# Patient Record
Sex: FEMALE | URBAN - METROPOLITAN AREA
[De-identification: names, ages, dates, MRNs, and addresses within clinical notes are randomized per-mention and may not be internally consistent; named-entity substitution may affect disease eponyms.]

---

## 2023-08-03 ENCOUNTER — TRANSCRIBE ORDERS (OUTPATIENT)
Facility: HOSPITAL | Age: 58
End: 2023-08-03

## 2023-08-03 DIAGNOSIS — R06.09 DOE (DYSPNEA ON EXERTION): Primary | ICD-10-CM

## 2023-08-23 ENCOUNTER — HOSPITAL ENCOUNTER (OUTPATIENT)
Facility: HOSPITAL | Age: 58
Discharge: HOME | End: 2023-08-25
Attending: INTERNAL MEDICINE
Payer: COMMERCIAL

## 2023-08-23 VITALS
HEIGHT: 64 IN | WEIGHT: 240 LBS | HEART RATE: 81 BPM | BODY MASS INDEX: 40.97 KG/M2 | SYSTOLIC BLOOD PRESSURE: 154 MMHG | DIASTOLIC BLOOD PRESSURE: 64 MMHG

## 2023-08-23 DIAGNOSIS — R06.09 DOE (DYSPNEA ON EXERTION): ICD-10-CM

## 2023-08-23 LAB
ECHO AO ROOT DIAM: 2.8 CM
ECHO AO ROOT INDEX: 1.33 CM/M2
ECHO AV AREA PEAK VELOCITY: 2.7 CM2
ECHO AV AREA VTI: 2.4 CM2
ECHO AV AREA/BSA PEAK VELOCITY: 1.3 CM2/M2
ECHO AV AREA/BSA VTI: 1.1 CM2/M2
ECHO AV MEAN GRADIENT: 4 MMHG
ECHO AV MEAN VELOCITY: 0.9 M/S
ECHO AV PEAK GRADIENT: 7 MMHG
ECHO AV PEAK VELOCITY: 1.3 M/S
ECHO AV VELOCITY RATIO: 0.92
ECHO AV VTI: 29.2 CM
ECHO BSA: 2.22 M2
ECHO LA DIAMETER INDEX: 1.47 CM/M2
ECHO LA DIAMETER: 3.1 CM
ECHO LA TO AORTIC ROOT RATIO: 1.11
ECHO LA VOL 2C: 21 ML (ref 22–52)
ECHO LA VOL 4C: 21 ML (ref 22–52)
ECHO LA VOL BP: 21 ML (ref 22–52)
ECHO LA VOL/BSA BIPLANE: 10 ML/M2 (ref 16–34)
ECHO LA VOLUME AREA LENGTH: 23 ML
ECHO LA VOLUME INDEX A2C: 10 ML/M2 (ref 16–34)
ECHO LA VOLUME INDEX A4C: 10 ML/M2 (ref 16–34)
ECHO LA VOLUME INDEX AREA LENGTH: 11 ML/M2 (ref 16–34)
ECHO LV E' LATERAL VELOCITY: 10 CM/S
ECHO LV E' SEPTAL VELOCITY: 8 CM/S
ECHO LV EDV A2C: 47 ML
ECHO LV EDV A4C: 58 ML
ECHO LV EDV BP: 52 ML (ref 56–104)
ECHO LV EDV INDEX A4C: 27 ML/M2
ECHO LV EDV INDEX BP: 25 ML/M2
ECHO LV EDV NDEX A2C: 22 ML/M2
ECHO LV EJECTION FRACTION A2C: 57 %
ECHO LV EJECTION FRACTION A4C: 55 %
ECHO LV EJECTION FRACTION BIPLANE: 55 % (ref 55–100)
ECHO LV ESV A2C: 20 ML
ECHO LV ESV A4C: 26 ML
ECHO LV ESV BP: 24 ML (ref 19–49)
ECHO LV ESV INDEX A2C: 9 ML/M2
ECHO LV ESV INDEX A4C: 12 ML/M2
ECHO LV ESV INDEX BP: 11 ML/M2
ECHO LV FRACTIONAL SHORTENING: 36 % (ref 28–44)
ECHO LV INTERNAL DIMENSION DIASTOLE INDEX: 2.13 CM/M2
ECHO LV INTERNAL DIMENSION DIASTOLIC: 4.5 CM (ref 3.9–5.3)
ECHO LV INTERNAL DIMENSION SYSTOLIC INDEX: 1.37 CM/M2
ECHO LV INTERNAL DIMENSION SYSTOLIC: 2.9 CM
ECHO LV IVSD: 0.7 CM (ref 0.6–0.9)
ECHO LV MASS 2D: 104.5 G (ref 67–162)
ECHO LV MASS INDEX 2D: 49.5 G/M2 (ref 43–95)
ECHO LV POSTERIOR WALL DIASTOLIC: 0.8 CM (ref 0.6–0.9)
ECHO LV RELATIVE WALL THICKNESS RATIO: 0.36
ECHO LVOT AREA: 3.1 CM2
ECHO LVOT AV VTI INDEX: 0.77
ECHO LVOT DIAM: 2 CM
ECHO LVOT MEAN GRADIENT: 2 MMHG
ECHO LVOT PEAK GRADIENT: 5 MMHG
ECHO LVOT PEAK VELOCITY: 1.2 M/S
ECHO LVOT STROKE VOLUME INDEX: 33.6 ML/M2
ECHO LVOT SV: 71 ML
ECHO LVOT VTI: 22.6 CM
ECHO MV A VELOCITY: 0.81 M/S
ECHO MV E DECELERATION TIME (DT): 272.1 MS
ECHO MV E VELOCITY: 0.62 M/S
ECHO MV E/A RATIO: 0.77
ECHO MV E/E' LATERAL: 6.2
ECHO MV E/E' RATIO (AVERAGED): 6.98
ECHO MV E/E' SEPTAL: 7.75
ECHO RA END SYSTOLIC VOLUME APICAL 4 CHAMBER INDEX BSA: 10 ML/M2
ECHO RA VOLUME: 21 ML
ECHO RV FREE WALL PEAK S': 12 CM/S
ECHO RV INTERNAL DIMENSION: 2.1 CM
ECHO RV TAPSE: 1.6 CM (ref 1.7–?)

## 2023-08-23 PROCEDURE — 93306 TTE W/DOPPLER COMPLETE: CPT

## 2023-09-08 RX ORDER — NEBIVOLOL 10 MG/1
10 TABLET ORAL DAILY
COMMUNITY

## 2023-09-08 RX ORDER — ESTRADIOL 0.05 MG/D
1 PATCH, EXTENDED RELEASE TRANSDERMAL
Status: ON HOLD | COMMUNITY
End: 2023-09-11 | Stop reason: HOSPADM

## 2023-09-08 RX ORDER — ONDANSETRON 4 MG/1
4 TABLET, ORALLY DISINTEGRATING ORAL EVERY 8 HOURS PRN
Status: ON HOLD | COMMUNITY
End: 2023-09-11 | Stop reason: HOSPADM

## 2023-09-08 RX ORDER — CHOLECALCIFEROL (VITAMIN D3) 1250 MCG
CAPSULE ORAL
COMMUNITY

## 2023-09-08 RX ORDER — ROSUVASTATIN CALCIUM 10 MG/1
10 TABLET, COATED ORAL
COMMUNITY

## 2023-09-08 RX ORDER — FESOTERODINE FUMARATE 8 MG/1
TABLET, EXTENDED RELEASE ORAL
Status: ON HOLD | COMMUNITY
End: 2023-09-11 | Stop reason: HOSPADM

## 2023-09-11 ENCOUNTER — HOSPITAL ENCOUNTER (OUTPATIENT)
Facility: HOSPITAL | Age: 58
Setting detail: OUTPATIENT SURGERY
Discharge: HOME | End: 2023-09-11
Attending: INTERNAL MEDICINE | Admitting: INTERNAL MEDICINE
Payer: COMMERCIAL

## 2023-09-11 VITALS
HEART RATE: 67 BPM | DIASTOLIC BLOOD PRESSURE: 70 MMHG | BODY MASS INDEX: 41.66 KG/M2 | HEIGHT: 64 IN | TEMPERATURE: 98.1 F | WEIGHT: 244 LBS | RESPIRATION RATE: 20 BRPM | OXYGEN SATURATION: 98 % | SYSTOLIC BLOOD PRESSURE: 118 MMHG

## 2023-09-11 DIAGNOSIS — R06.09 DOE (DYSPNEA ON EXERTION): ICD-10-CM

## 2023-09-11 DIAGNOSIS — R94.39 ABNORMAL STRESS TEST: ICD-10-CM

## 2023-09-11 DIAGNOSIS — R07.9 CHEST PAIN: ICD-10-CM

## 2023-09-11 LAB
ANION GAP BLD CALC-SCNC: ABNORMAL (ref 10–20)
ANION GAP BLD CALC-SCNC: ABNORMAL (ref 10–20)
ANION GAP SERPL CALC-SCNC: 2 MMOL/L (ref 3–18)
BASE DEFICIT BLD-SCNC: 1 MMOL/L
BASE EXCESS BLD CALC-SCNC: 0 MMOL/L
BASOPHILS # BLD: 0 K/UL (ref 0–0.1)
BASOPHILS NFR BLD: 0 % (ref 0–2)
BUN SERPL-MCNC: 23 MG/DL (ref 7–18)
BUN/CREAT SERPL: 33 (ref 12–20)
CA-I BLD-MCNC: 1.2 MMOL/L (ref 1.12–1.32)
CA-I BLD-MCNC: 1.23 MMOL/L (ref 1.12–1.32)
CALCIUM SERPL-MCNC: 8.6 MG/DL (ref 8.5–10.1)
CHLORIDE BLD-SCNC: 109 MMOL/L (ref 98–107)
CHLORIDE BLD-SCNC: 110 MMOL/L (ref 98–107)
CHLORIDE SERPL-SCNC: 114 MMOL/L (ref 100–111)
CHOLEST SERPL-MCNC: 139 MG/DL
CO2 BLD-SCNC: 23 MMOL/L (ref 19–24)
CO2 BLD-SCNC: 25 MMOL/L (ref 19–24)
CO2 SERPL-SCNC: 28 MMOL/L (ref 21–32)
CREAT BLD-MCNC: 0.32 MG/DL (ref 0.6–1.3)
CREAT BLD-MCNC: 0.36 MG/DL (ref 0.6–1.3)
CREAT SERPL-MCNC: 0.69 MG/DL (ref 0.6–1.3)
DIFFERENTIAL METHOD BLD: NORMAL
ECHO BSA: 2.24 M2
EOSINOPHIL # BLD: 0.1 K/UL (ref 0–0.4)
EOSINOPHIL NFR BLD: 2 % (ref 0–5)
ERYTHROCYTE [DISTWIDTH] IN BLOOD BY AUTOMATED COUNT: 14 % (ref 11.6–14.5)
FIO2 ON VENT: 21 %
FIO2 ON VENT: 21 %
GLUCOSE BLD-MCNC: 94 MG/DL (ref 65–100)
GLUCOSE BLD-MCNC: 96 MG/DL (ref 65–100)
GLUCOSE SERPL-MCNC: 94 MG/DL (ref 74–99)
HCO3 BLD-SCNC: 23.4 MMOL/L (ref 22–26)
HCO3 BLD-SCNC: 25.3 MMOL/L (ref 22–26)
HCT VFR BLD AUTO: 40.7 % (ref 35–45)
HDLC SERPL-MCNC: 65 MG/DL (ref 40–60)
HDLC SERPL: 2.1 (ref 0–5)
HGB BLD-MCNC: 12.9 G/DL (ref 12–16)
IMM GRANULOCYTES # BLD AUTO: 0 K/UL (ref 0–0.04)
IMM GRANULOCYTES NFR BLD AUTO: 0 % (ref 0–0.5)
INR PPP: 0.9 (ref 0.9–1.1)
LACTATE BLD-SCNC: <0.4 MMOL/L (ref 0.4–2)
LACTATE BLD-SCNC: <0.4 MMOL/L (ref 0.4–2)
LDLC SERPL CALC-MCNC: 60.4 MG/DL (ref 0–100)
LIPID PANEL: ABNORMAL
LYMPHOCYTES # BLD: 1.5 K/UL (ref 0.9–3.6)
LYMPHOCYTES NFR BLD: 21 % (ref 21–52)
MAGNESIUM SERPL-MCNC: 2.1 MG/DL (ref 1.6–2.6)
MCH RBC QN AUTO: 29 PG (ref 24–34)
MCHC RBC AUTO-ENTMCNC: 31.7 G/DL (ref 31–37)
MCV RBC AUTO: 91.5 FL (ref 78–100)
MONOCYTES # BLD: 0.6 K/UL (ref 0.05–1.2)
MONOCYTES NFR BLD: 8 % (ref 3–10)
NEUTS SEG # BLD: 4.9 K/UL (ref 1.8–8)
NEUTS SEG NFR BLD: 69 % (ref 40–73)
NRBC # BLD: 0 K/UL (ref 0–0.01)
NRBC BLD-RTO: 0 PER 100 WBC
PCO2 BLD: 37.6 MMHG (ref 35–45)
PCO2 BLDV: 42.5 MMHG (ref 41–51)
PH BLD: 7.4 (ref 7.35–7.45)
PH BLDV: 7.38 (ref 7.32–7.42)
PLATELET # BLD AUTO: 232 K/UL (ref 135–420)
PMV BLD AUTO: 9.2 FL (ref 9.2–11.8)
PO2 BLD: 80 MMHG (ref 80–100)
PO2 BLDV: 42 MMHG (ref 25–40)
POTASSIUM BLD-SCNC: 3.8 MMOL/L (ref 3.5–5.1)
POTASSIUM BLD-SCNC: 3.9 MMOL/L (ref 3.5–5.1)
POTASSIUM SERPL-SCNC: 4 MMOL/L (ref 3.5–5.5)
PROTHROMBIN TIME: 12.6 SEC (ref 11.9–14.7)
RBC # BLD AUTO: 4.45 M/UL (ref 4.2–5.3)
SAO2 % BLD: 76 %
SAO2 % BLD: 96 %
SERVICE CMNT-IMP: ABNORMAL
SERVICE CMNT-IMP: ABNORMAL
SODIUM BLD-SCNC: 145 MMOL/L (ref 136–145)
SODIUM BLD-SCNC: 145 MMOL/L (ref 136–145)
SODIUM SERPL-SCNC: 144 MMOL/L (ref 136–145)
SPECIMEN SITE: ABNORMAL
SPECIMEN SITE: ABNORMAL
TRIGL SERPL-MCNC: 68 MG/DL
VLDLC SERPL CALC-MCNC: 13.6 MG/DL
WBC # BLD AUTO: 7.1 K/UL (ref 4.6–13.2)

## 2023-09-11 PROCEDURE — 82947 ASSAY GLUCOSE BLOOD QUANT: CPT

## 2023-09-11 PROCEDURE — 80061 LIPID PANEL: CPT

## 2023-09-11 PROCEDURE — 82330 ASSAY OF CALCIUM: CPT

## 2023-09-11 PROCEDURE — 84295 ASSAY OF SERUM SODIUM: CPT

## 2023-09-11 PROCEDURE — 2500000003 HC RX 250 WO HCPCS: Performed by: INTERNAL MEDICINE

## 2023-09-11 PROCEDURE — 99153 MOD SED SAME PHYS/QHP EA: CPT | Performed by: INTERNAL MEDICINE

## 2023-09-11 PROCEDURE — 80048 BASIC METABOLIC PNL TOTAL CA: CPT

## 2023-09-11 PROCEDURE — C1769 GUIDE WIRE: HCPCS | Performed by: INTERNAL MEDICINE

## 2023-09-11 PROCEDURE — 6360000004 HC RX CONTRAST MEDICATION: Performed by: INTERNAL MEDICINE

## 2023-09-11 PROCEDURE — 85025 COMPLETE CBC W/AUTO DIFF WBC: CPT

## 2023-09-11 PROCEDURE — 6360000002 HC RX W HCPCS: Performed by: INTERNAL MEDICINE

## 2023-09-11 PROCEDURE — 83735 ASSAY OF MAGNESIUM: CPT

## 2023-09-11 PROCEDURE — 85610 PROTHROMBIN TIME: CPT

## 2023-09-11 PROCEDURE — C1894 INTRO/SHEATH, NON-LASER: HCPCS | Performed by: INTERNAL MEDICINE

## 2023-09-11 PROCEDURE — 83605 ASSAY OF LACTIC ACID: CPT

## 2023-09-11 PROCEDURE — 2580000003 HC RX 258: Performed by: INTERNAL MEDICINE

## 2023-09-11 PROCEDURE — 99152 MOD SED SAME PHYS/QHP 5/>YRS: CPT | Performed by: INTERNAL MEDICINE

## 2023-09-11 PROCEDURE — 93460 R&L HRT ART/VENTRICLE ANGIO: CPT | Performed by: INTERNAL MEDICINE

## 2023-09-11 PROCEDURE — 85014 HEMATOCRIT: CPT

## 2023-09-11 PROCEDURE — 84132 ASSAY OF SERUM POTASSIUM: CPT

## 2023-09-11 PROCEDURE — 6370000000 HC RX 637 (ALT 250 FOR IP): Performed by: INTERNAL MEDICINE

## 2023-09-11 PROCEDURE — 82803 BLOOD GASES ANY COMBINATION: CPT

## 2023-09-11 PROCEDURE — 2709999900 HC NON-CHARGEABLE SUPPLY: Performed by: INTERNAL MEDICINE

## 2023-09-11 PROCEDURE — 36415 COLL VENOUS BLD VENIPUNCTURE: CPT

## 2023-09-11 RX ORDER — SODIUM CHLORIDE 9 MG/ML
INJECTION, SOLUTION INTRAVENOUS PRN
Status: DISCONTINUED | OUTPATIENT
Start: 2023-09-11 | End: 2023-09-11 | Stop reason: HOSPADM

## 2023-09-11 RX ORDER — ROSUVASTATIN CALCIUM 10 MG/1
10 TABLET, COATED ORAL DAILY
Status: ON HOLD | COMMUNITY
End: 2023-09-11 | Stop reason: HOSPADM

## 2023-09-11 RX ORDER — HEPARIN SODIUM 1000 [USP'U]/ML
INJECTION, SOLUTION INTRAVENOUS; SUBCUTANEOUS PRN
Status: DISCONTINUED | OUTPATIENT
Start: 2023-09-11 | End: 2023-09-11 | Stop reason: HOSPADM

## 2023-09-11 RX ORDER — ACETAMINOPHEN 325 MG/1
650 TABLET ORAL EVERY 4 HOURS PRN
Status: DISCONTINUED | OUTPATIENT
Start: 2023-09-11 | End: 2023-09-11 | Stop reason: HOSPADM

## 2023-09-11 RX ORDER — ASPIRIN 81 MG/1
81 TABLET, CHEWABLE ORAL DAILY
Status: DISCONTINUED | OUTPATIENT
Start: 2023-09-11 | End: 2023-09-11 | Stop reason: HOSPADM

## 2023-09-11 RX ORDER — LIDOCAINE HYDROCHLORIDE 10 MG/ML
INJECTION, SOLUTION INFILTRATION; PERINEURAL PRN
Status: DISCONTINUED | OUTPATIENT
Start: 2023-09-11 | End: 2023-09-11 | Stop reason: HOSPADM

## 2023-09-11 RX ORDER — SODIUM CHLORIDE 0.9 % (FLUSH) 0.9 %
5-40 SYRINGE (ML) INJECTION PRN
Status: DISCONTINUED | OUTPATIENT
Start: 2023-09-11 | End: 2023-09-11 | Stop reason: HOSPADM

## 2023-09-11 RX ORDER — HEPARIN SODIUM 200 [USP'U]/100ML
INJECTION, SOLUTION INTRAVENOUS CONTINUOUS PRN
Status: COMPLETED | OUTPATIENT
Start: 2023-09-11 | End: 2023-09-11

## 2023-09-11 RX ORDER — TRAMADOL HYDROCHLORIDE 50 MG/1
50 TABLET ORAL EVERY 6 HOURS PRN
Status: ON HOLD | COMMUNITY
End: 2023-09-11 | Stop reason: HOSPADM

## 2023-09-11 RX ORDER — VERAPAMIL HYDROCHLORIDE 2.5 MG/ML
INJECTION, SOLUTION INTRAVENOUS PRN
Status: DISCONTINUED | OUTPATIENT
Start: 2023-09-11 | End: 2023-09-11 | Stop reason: HOSPADM

## 2023-09-11 RX ORDER — SODIUM CHLORIDE 0.9 % (FLUSH) 0.9 %
5-40 SYRINGE (ML) INJECTION EVERY 12 HOURS SCHEDULED
Status: DISCONTINUED | OUTPATIENT
Start: 2023-09-11 | End: 2023-09-11 | Stop reason: HOSPADM

## 2023-09-11 RX ORDER — HYDROMORPHONE HYDROCHLORIDE 2 MG/1
2 TABLET ORAL
Status: ON HOLD | COMMUNITY
End: 2023-09-11 | Stop reason: HOSPADM

## 2023-09-11 RX ORDER — SODIUM CHLORIDE 9 MG/ML
INJECTION, SOLUTION INTRAVENOUS CONTINUOUS
Status: DISCONTINUED | OUTPATIENT
Start: 2023-09-11 | End: 2023-09-11 | Stop reason: HOSPADM

## 2023-09-11 RX ORDER — MIDAZOLAM HYDROCHLORIDE 1 MG/ML
INJECTION INTRAMUSCULAR; INTRAVENOUS PRN
Status: DISCONTINUED | OUTPATIENT
Start: 2023-09-11 | End: 2023-09-11 | Stop reason: HOSPADM

## 2023-09-11 RX ADMIN — ASPIRIN 81 MG: 81 TABLET, CHEWABLE ORAL at 10:12

## 2023-09-11 RX ADMIN — SODIUM CHLORIDE: 9 INJECTION, SOLUTION INTRAVENOUS at 10:10

## 2023-09-11 NOTE — DISCHARGE INSTRUCTIONS
HEART CATHETERIZATION/ANGIOGRAPHY DISCHARGE INSTRUCTIONS    Check puncture site frequently for swelling or bleeding. If there is any bleeding, lie down and apply pressure over the area with a clean towel or washcloth. Notify your doctor for any redness, swelling, drainage, or oozing from the puncture site. Notify your doctor for any fever or chills. If the extremity becomes cold, numb, or painful call Dr. Melyssa Chandler   Activity should be limited for the next 48 hours. Climb stairs as little as possible and avoid any stooping, bending, or strenuous activity for 48 hours. No heavy lifting (anything over 10 pounds) for 3 days. You may resume your usual diet. Drink more fluids than usual.  Have a responsible person drive you home and stay with you for at least 24 hours after your heart catheterization/angiography. You may remove bandage from your Right and Arm in 24 hours. You may shower in 24 hours. No tub baths, hot tubs, or swimming for 1 week. Do not place any lotions, creams, powders, or ointments over puncture site for 1 week. You may place a clean band-aid over the puncture site each day for 5 days. Change daily. I have read the above instructions and have had the opportunity to ask questions. Patient: ________________________   Date: 9/11/2023    Witness: _______________________   Date: 9/11/2023     General Discharge: Care Instructions  Your Care Instructions     One or more doctors have given you a physical exam, reviewed your symptoms, and asked about your past medical problems. You have had tests as needed. At this time, the doctors feel it is safe for you to go home. It is very important that you follow up with your doctor as directed. If you continue to have symptoms, you may need more tests or treatment. The doctor has checked you carefully, but problems can develop later. If you notice any problems or new symptoms,  get medical treatment right away.   Follow-up care is a key part of your treatment

## 2023-09-11 NOTE — INTERVAL H&P NOTE
Update History & Physical    The patient's History and Physical of September 11, 2023 was reviewed with the patient and I examined the patient. There was no change. The surgical site was confirmed by the patient and me. Plan: The risks, benefits, expected outcome, and alternative to the recommended procedure have been discussed with the patient. Patient understands and wants to proceed with the procedure.      Electronically signed by Uzair Ahuja MD on 9/11/2023 at 11:40 AM

## 2023-09-11 NOTE — PROGRESS NOTES
Pt is all prepped and ready for procedure. 1130 Pt picked up for procedure. 1255 Pt back to care unit S/P Lt & Rt heart Caths. Pt awake and alert and tolerated well. Tr band to right wrist and right brachial sheath still in place. Sites WNL. Post-op plan and precautions reinforced. 1355 Initiating removing air from tr band    1415Tr band removed and tolerated well. No bleeding noted to site. Sterile 2x2 with tegaderm dressing applied to site. 1420 Rt brachial sheath pulled and manual pressure applied for 10 minutes. No bleeding noted to site. Sterile 2x2 with tegaderm dressing applied to site. All sites WNL. Monitoring continues. 200 Dr Cassie Young in to see patient. Findings and plan of care discussed with patient and SO.     1500 Discharge instructions reviewed with patient and SO and they verbalized all understandings. 65 Pt escorted to car and left with SO in stable condition.

## 2023-09-11 NOTE — BRIEF OP NOTE
Brief Postoperative Note      Patient: Carlos Preciado  YOB: 1965  MRN: [id number]    Date of Procedure: 9/11/2023    Pre-Op Diagnosis Codes:     * COBIAN (dyspnea on exertion) [R06.09]     * Chest pain [R07.9]     * Abnormal stress test [R94.39]    Post-Op Diagnosis: Same       Procedure(s):  Left and right heart cath / coronary angiography    Surgeon(s):  Addie Mcgill MD    Assistant:  * No surgical staff found *    Anesthesia: IV Sedation    Estimated Blood Loss (mL): less than 50     Complications: None    Specimens:   * No specimens in log *    Implants:  * No implants in log *      Drains: * No LDAs found *    Findings: Patent coronary arteries with minimal luminal irregularities. Elevated LVEDP and moderate group 2 pulmonary hypertension.       Electronically signed by Addie Mcgill MD on 9/11/2023 at 1:04 PM

## 2023-09-11 NOTE — POST SEDATION
Sedation Post Procedure Note    Patient Name: Wilda Alfaro   YOB: 1965  Room/Bed: [facility]/  Medical Record Number: [id number]  Date: 9/11/2023   Time: 1:06 PM         Physicians/Assistants: Donny Sandoval MD, MD    Procedure Performed:  RHC, LHC, coronary angiogram    Post-Sedation Vital Signs:  Vitals:    09/11/23 1254   BP: 133/67   Pulse: 68   Resp: 17   Temp:    SpO2: 96%      Vital signs were reviewed and were stable after the procedure (see flow sheet for vitals)            Post-Sedation Exam: Lungs: clear to auscultation bilaterally without crackles or wheezing and Cardiovascular: regular rate and rhythm, no murmurs rubs or gallops           Complications: none    Electronically signed by Donny Sandoval MD on 9/11/2023 at 1:06 PM

## 2025-06-20 ENCOUNTER — HOSPITAL ENCOUNTER (OUTPATIENT)
Facility: HOSPITAL | Age: 60
Setting detail: RECURRING SERIES
Discharge: HOME | End: 2025-06-23
Payer: COMMERCIAL

## 2025-06-20 PROCEDURE — 97161 PT EVAL LOW COMPLEX 20 MIN: CPT

## 2025-06-20 PROCEDURE — 97110 THERAPEUTIC EXERCISES: CPT

## 2025-06-20 NOTE — PROGRESS NOTES
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 Patient did not improve with MONALISA. Patient is tentatively scheduled for right TKA revision and excision of scar tissue once she loses 20 pounds and completes a course of Aquatic PT. Patient describes pain as aching anterior and deep right knee joint. Pain is worse in PM. Denies numbness/tingling. Denies popping/clicking. Aggravating factors:weight bearing activities. Alleviating factors: rest, ice, medicaton.  Denies red flags: SOB, chest pain, dizziness/lightheadedness, blurred/double vision, HA, chills/fevers, night sweats, change in bowel/bladder control, abdominal pain, difficulty swallowing, slurred speech, unexplained weight gain/loss, nausea, vomiting. PMHx: morbid obesity depression, OA, TIA, CAD. Surgical Hx: right TKA . Social Hx: single, two level home, social alcohol, no tobacco, full time sedentary work. PLOF: ambulatory community distance without device. CLOF: stand/walk 
included in education: patient (P)  Barriers to Learning/Limitations: None  Measures taken if barriers to learning present: NA  Patient Goal (s): “improve motion”  Patient Self Reported Health Status: good  Rehabilitation Potential: good    Short Term Goals: To be accomplished in 4 weeks:   Patient will report compliance with aquatic and land based HEP to aid in rehabilitation program.   Status at IE: Patient instructed in initial land based gym program and will begin instruction in Aquatic Exercise Program next session.   Current: Same as IE     Patient will demonstrate right knee AROM of 5 to 100 degrees to aid in completion of ADLs.   Status at IE: 12 to 92 degrees   Current: Same as IE       Long Term Goals: To be accomplished in 8 weeks:   Patient will increase strength to 5/5 throughout B LEs to aid in completion of ADLs.   Status at IE:  right knee flex 3+, ext 3+   Current: Same as IE     Patient will report pain less than 1-2/10 average to aid in completion of ADLs.   Status at IE:2-8/10   Current: Same as IE     Patient will decrease TUG by 5 seconds to demonstrate increased safety in mobility.   Status at IE: 15.9 seconds   Current: Same as IE     Patient will improve 30 second sit to stand score to 15 repetitions to demonstrate increased proximal bilateral LE strength and associated increased safety in mobility.   Status at IE: 9 repetitions   Current: Same as IE      Patient will improve Lower Extremity Functional Scale score by MCID of 9 points to demonstrate improvement in functional ability..   Status at IE: 54   Current: Same as IE       Frequency / Duration: Patient to be seen 2 times per week for 8 weeks    Patient/ Caregiver education and instruction: Diagnosis, prognosis, self care, activity modification, and exercises     [x]  Plan of care has been reviewed with PTA    Certification Period: N/A  Joshua Hendrickson, PT 6/20/2025 11:16

## 2025-07-03 ENCOUNTER — HOSPITAL ENCOUNTER (OUTPATIENT)
Facility: HOSPITAL | Age: 60
Setting detail: RECURRING SERIES
Discharge: HOME | End: 2025-07-06
Payer: COMMERCIAL

## 2025-07-03 PROCEDURE — 97113 AQUATIC THERAPY/EXERCISES: CPT

## 2025-07-03 NOTE — PROGRESS NOTES
PHYSICAL / OCCUPATIONAL THERAPY - DAILY TREATMENT NOTE (updated )    Patient Name: Lilibeth Peck    Date: 7/3/2025    : 1965  [place]: [place] / [place] / Product Type: *No Product type* /      Patient  verified Yes     Visit #   Current / Total 2 16   Time   In / Out 1551 1646   Pain   In / Out 3 1-2   Subjective Functional Status/Changes: \"I was on my feet a lot the past couple days so the knee is a bit more aggravated today.\"   Changes to:  Meds, Allergies, Med Hx, Sx Hx?  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     OBJECTIVE    Therapeutic Procedures:  Tx Min Billable or 1:1 Min (if diff from Tx Min) Procedure, Rationale, Specifics   55 45 07147 Aquatic Therapy (timed):  decrease pain, improve strength, flexibility, and range of motion using the buoyancy, thermal properties and consistent resistance of the water to improve patient's ability to progress to PLOF and address remaining functional goals.  (see flow sheet as applicable)     Details if applicable:       55 45 [facility] Totals Reminder: bill using total billable min of TIMED therapeutic procedures (example: do not include dry needle or estim unattended, both untimed codes, in totals to left)  8-22 min = 1 unit; 23-37 min = 2 units; 38-52 min = 3 units; 53-67 min = 4 units; 68-82 min = 5 units   Total Total     TOTAL TREATMENT TIME:        55     [x]  Patient Education billed concurrently with other procedures   [x] Review HEP    [] Progressed/Changed HEP, detail:    [] Other detail:       Objective Information/Functional

## 2025-07-08 ENCOUNTER — HOSPITAL ENCOUNTER (OUTPATIENT)
Facility: HOSPITAL | Age: 60
Setting detail: RECURRING SERIES
Discharge: HOME | End: 2025-07-11
Payer: COMMERCIAL

## 2025-07-08 PROCEDURE — 97113 AQUATIC THERAPY/EXERCISES: CPT

## 2025-07-08 NOTE — PROGRESS NOTES
PHYSICAL / OCCUPATIONAL THERAPY - DAILY TREATMENT NOTE (updated )    Patient Name: Lilibeth Peck    Date: 2025    : 1965  [place]: [place] / [place] / Product Type: *No Product type* /      Patient  verified Yes     Visit #   Current / Total 3 16   Time   In / Out 1604 1656   Pain   In / Out 1-2 1   Subjective Functional Status/Changes: \"I haven't been on my feet as much the past couple days so the knee pain is less than usual.\"   Changes to:  Meds, Allergies, Med Hx, Sx Hx?  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     OBJECTIVE    Therapeutic Procedures:  Tx Min Billable or 1:1 Min (if diff from Tx Min) Procedure, Rationale, Specifics   52 40 08114 Aquatic Therapy (timed):  decrease pain, improve strength, flexibility, and range of motion using the buoyancy, thermal properties and consistent resistance of the water to improve patient's ability to progress to PLOF and address remaining functional goals.  (see flow sheet as applicable)     Details if applicable:       52 40 [facility] Totals Reminder: bill using total billable min of TIMED therapeutic procedures (example: do not include dry needle or estim unattended, both untimed codes, in totals to left)  8-22 min = 1 unit; 23-37 min = 2 units; 38-52 min = 3 units; 53-67 min = 4 units; 68-82 min = 5 units   Total Total     TOTAL TREATMENT TIME:        52     [x]  Patient Education billed concurrently with other procedures   [x] Review HEP    [] Progressed/Changed HEP, detail:    [] Other detail:       Objective Information/Functional

## 2025-07-10 ENCOUNTER — HOSPITAL ENCOUNTER (OUTPATIENT)
Facility: HOSPITAL | Age: 60
Setting detail: RECURRING SERIES
Discharge: HOME | End: 2025-07-13
Payer: COMMERCIAL

## 2025-07-10 PROCEDURE — 97113 AQUATIC THERAPY/EXERCISES: CPT

## 2025-07-10 NOTE — PROGRESS NOTES
Information/Functional Measures/Assessment    Patient demonstrating excellent response to Aquatic PT with significant decrease in right knee pain intensity. Patient is now ready for trial of adding increasing resistance to aquatic exercises. Instructed patient in use of kick board for resistance during walking exercises. Patient tolerated increased resistance without any recurrence of pain symptoms.    Patient will continue to benefit from skilled PT / OT services to modify and progress therapeutic interventions, analyze and address functional mobility deficits, analyze and address ROM deficits, analyze and address strength deficits, analyze and address soft tissue restrictions, analyze and cue for proper movement patterns, analyze and modify for postural abnormalities, analyze and address imbalance/dizziness, and instruct in home and community integration to address functional deficits and attain remaining goals.    Progress toward goals / Updated goals:  []  See Progress Note/Recertification    Short Term Goals: To be accomplished in 4 weeks:                 Patient will report compliance with aquatic and land based HEP to aid in rehabilitation program.                 Status at IE: Patient instructed in initial land based gym program and will begin instruction in Aquatic Exercise Program next session.                 Current: Patient provided provided written copy of Aquatic Exercise Program for Management of Knee Pain as patient has daily access to a pool and will be able to try adding another one or two aquatic exercise sessions per week to her weekly regimen.    7/10/25                    Patient will demonstrate right knee AROM of 5 to 100 degrees to aid in completion of ADLs.                 Status at IE: 12 to 92 degrees                 Current: 10 to 94 degrees.     7/8/25        Long Term Goals: To be accomplished in 8 weeks:                 Patient will increase strength to 5/5 throughout B LEs to aid in

## 2025-07-15 ENCOUNTER — HOSPITAL ENCOUNTER (OUTPATIENT)
Facility: HOSPITAL | Age: 60
Setting detail: RECURRING SERIES
Discharge: HOME | End: 2025-07-18
Payer: COMMERCIAL

## 2025-07-15 PROCEDURE — 97113 AQUATIC THERAPY/EXERCISES: CPT

## 2025-07-15 NOTE — PROGRESS NOTES
PHYSICAL / OCCUPATIONAL THERAPY - DAILY TREATMENT NOTE (updated )    Patient Name: Lilibeth Peck    Date: 7/15/2025    : 1965  [place]: [place] / [place] / Product Type: *No Product type* /      Patient  verified Yes     Visit #   Current / Total 5 16   Time   In / Out 1252 1401   Pain   In / Out \"Stiff\" \"Less stiff\"   Subjective Functional Status/Changes: \"The aquatic exercises seem to be helping my knee.\"   Changes to:  Meds, Allergies, Med Hx, Sx Hx?  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     OBJECTIVE    Therapeutic Procedures:  Tx Min Billable or 1:1 Min (if diff from Tx Min) Procedure, Rationale, Specifics   69 54 29910 Aquatic Therapy (timed):  decrease pain, improve strength, flexibility, and range of motion using the buoyancy, thermal properties and consistent resistance of the water to improve patient's ability to progress to PLOF and address remaining functional goals.  (see flow sheet as applicable)     Details if applicable:       69 54 [facility] Totals Reminder: bill using total billable min of TIMED therapeutic procedures (example: do not include dry needle or estim unattended, both untimed codes, in totals to left)  8-22 min = 1 unit; 23-37 min = 2 units; 38-52 min = 3 units; 53-67 min = 4 units; 68-82 min = 5 units   Total Total     TOTAL TREATMENT TIME:        69     [x]  Patient Education billed concurrently with other procedures   [x] Review HEP    [] Progressed/Changed HEP, detail:    [] Other detail:       Objective Information/Functional Measures/Assessment    Added higher level resistance

## 2025-07-17 ENCOUNTER — TELEPHONE (OUTPATIENT)
Facility: HOSPITAL | Age: 60
End: 2025-07-17

## 2025-07-17 ENCOUNTER — HOSPITAL ENCOUNTER (OUTPATIENT)
Facility: HOSPITAL | Age: 60
Setting detail: RECURRING SERIES
End: 2025-07-17
Payer: COMMERCIAL

## 2025-07-22 ENCOUNTER — HOSPITAL ENCOUNTER (OUTPATIENT)
Facility: HOSPITAL | Age: 60
Setting detail: RECURRING SERIES
Discharge: HOME | End: 2025-07-25
Payer: COMMERCIAL

## 2025-07-22 PROCEDURE — 97113 AQUATIC THERAPY/EXERCISES: CPT

## 2025-07-22 NOTE — PROGRESS NOTES
PHYSICAL / OCCUPATIONAL THERAPY - DAILY TREATMENT NOTE (updated )    Patient Name: Lilibeth Peck    Date: 2025    : 1965  [place]: [place] / [place] / Product Type: *No Product type* /      Patient  verified Yes     Visit #   Current / Total 6 16   Time   In / Out 1510 1617   Pain   In / Out 0 0   Subjective Functional Status/Changes: \"The knee is stiff but much less painful lately.\"   Changes to:  Meds, Allergies, Med Hx, Sx Hx?  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     OBJECTIVE    Therapeutic Procedures:  Tx Min Billable or 1:1 Min (if diff from Tx Min) Procedure, Rationale, Specifics   67 57 27603 Aquatic Therapy (timed):  decrease pain, improve strength, flexibility, and range of motion using the buoyancy, thermal properties and consistent resistance of the water to improve patient's ability to progress to PLOF and address remaining functional goals.  (see flow sheet as applicable)     Details if applicable:       67 57 [facility] Totals Reminder: bill using total billable min of TIMED therapeutic procedures (example: do not include dry needle or estim unattended, both untimed codes, in totals to left)  8-22 min = 1 unit; 23-37 min = 2 units; 38-52 min = 3 units; 53-67 min = 4 units; 68-82 min = 5 units   Total Total     TOTAL TREATMENT TIME:        67     [x]  Patient Education billed concurrently with other procedures   [x] Review HEP    [] Progressed/Changed HEP, detail:    [] Other detail:       Objective Information/Functional Measures/Assessment    Patient has been well motivated, consistent and

## 2025-07-22 NOTE — PROGRESS NOTES
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 Surgical Hx: right TKA    Prior Level of Function: ambulatory community distance without device     Reporting Period: 25-25    Visits from Start of Care: 6    Missed Visits: 1    Updated Goals/Measure of Progress: To be achieved in 4 weeks:    Short Term Goals: To be accomplished in 4 weeks:                 Patient will report compliance with aquatic and land based HEP to aid in rehabilitation program.                 Status at IE: Patient instructed in initial land based gym program and will begin instruction in Aquatic Exercise Program next session.                 Current: Patient provided provided written copy of Aquatic Exercise Program for Management of Knee Pain as patient has daily access to a pool and will be able to try adding another one or two aquatic exercise sessions per week to her weekly regimen.    7/10/25                    Patient will demonstrate right knee AROM of 5 to 100 degrees to aid in completion of ADLs.                 Status at IE: 12 to 92 degrees                 Current: 10 to 96 degrees.     25        Long Term Goals: To be

## 2025-07-24 ENCOUNTER — HOSPITAL ENCOUNTER (OUTPATIENT)
Facility: HOSPITAL | Age: 60
Setting detail: RECURRING SERIES
Discharge: HOME | End: 2025-07-27
Payer: COMMERCIAL

## 2025-07-24 PROCEDURE — 97113 AQUATIC THERAPY/EXERCISES: CPT

## 2025-07-24 NOTE — PROGRESS NOTES
PHYSICAL / OCCUPATIONAL THERAPY - DAILY TREATMENT NOTE (updated )    Patient Name: Lilibeth Peck    Date: 2025    : 1965  [place]: [place] / [place] / Product Type: *No Product type* /      Patient  verified Yes     Visit #   Current / Total 7 16   Time   In / Out 1554 1640   Pain   In / Out 0 0   Subjective Functional Status/Changes: \"I am feeling better with the Aquatic PT and would like to learn some ways I can add more resistance to the aquatic exercises.\"   Changes to:  Meds, Allergies, Med Hx, Sx Hx?  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     OBJECTIVE    Therapeutic Procedures:  Tx Min Billable or 1:1 Min (if diff from Tx Min) Procedure, Rationale, Specifics   46  12597 Aquatic Therapy (timed):  decrease pain, improve strength, flexibility, and range of motion using the buoyancy, thermal properties and consistent resistance of the water to improve patient's ability to progress to PLOF and address remaining functional goals.  (see flow sheet as applicable)     Details if applicable:       46  [facility] Totals Reminder: bill using total billable min of TIMED therapeutic procedures (example: do not include dry needle or estim unattended, both untimed codes, in totals to left)  8-22 min = 1 unit; 23-37 min = 2 units; 38-52 min = 3 units; 53-67 min = 4 units; 68-82 min = 5 units   Total Total     TOTAL TREATMENT TIME:        46     [x]  Patient Education billed concurrently with other procedures   [x] Review HEP    [] Progressed/Changed HEP, detail:    [] Other detail:       Objective

## 2025-07-29 ENCOUNTER — HOSPITAL ENCOUNTER (OUTPATIENT)
Facility: HOSPITAL | Age: 60
Setting detail: RECURRING SERIES
Discharge: HOME | End: 2025-08-01
Payer: COMMERCIAL

## 2025-07-29 PROCEDURE — 97113 AQUATIC THERAPY/EXERCISES: CPT

## 2025-07-29 NOTE — PROGRESS NOTES
PHYSICAL / OCCUPATIONAL THERAPY - DAILY TREATMENT NOTE (updated )    Patient Name: Lilibeth Peck    Date: 2025    : 1965  Insurance: Pay[place] / Plan[place] / Product Type: *No Product type* /      Patient  verified Yes     Visit #   Current / Total 8 16   Time   In / Out 1529 1621   Pain   In / Out 1-2 0   Subjective Functional Status/Changes: \"I am finding the addition of the aquatic exercise fins you have showed me very helpful in getting better resistance for the exercises.\"   Changes to:  Meds, Allergies, Med Hx, Sx Hx?  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     OBJECTIVE    Therapeutic Procedures:  Tx Min Billable or 1:1 Min (if diff from Tx Min) Procedure, Rationale, Specifics   52 40 [id number] Aquatic Therapy (timed):  decrease pain, improve strength, flexibility, and range of motion using the buoyancy, thermal properties and consistent resistance of the water to improve patient's ability to progress to PLOF and address remaining functional goals.  (see flow sheet as applicable)     Details if applicable:       52 40 [facility] Totals Reminder: bill using total billable min of TIMED therapeutic procedures (example: do not include dry needle or estim unattended, both untimed codes, in totals to left)  8-22 min = 1 unit; 23-37 min = 2 units; 38-52 min = 3 units; 53-67 min = 4 units; 68-82 min = 5 units   Total Total     TOTAL TREATMENT TIME:        52     [x]  Patient Education billed concurrently with other procedures   [x] Review HEP    [] Progressed/Changed HEP, detail:    [] Other detail:       Objective

## 2025-07-31 ENCOUNTER — HOSPITAL ENCOUNTER (OUTPATIENT)
Facility: HOSPITAL | Age: 60
Setting detail: RECURRING SERIES
End: 2025-07-31
Payer: COMMERCIAL

## 2025-07-31 PROCEDURE — 97113 AQUATIC THERAPY/EXERCISES: CPT

## 2025-07-31 NOTE — PROGRESS NOTES
PHYSICAL / OCCUPATIONAL THERAPY - DAILY TREATMENT NOTE (updated )    Patient Name: Lilibeth Peck    Date: 2025    : 1965  [place]: [place] / [place] / Product Type: *No Product type* /      Patient  verified Yes     Visit #   Current / Total 9 16   Time   In / Out 1518 1618   Pain   In / Out 1 0   Subjective Functional Status/Changes: \"The Aquatic PT is helping me reach the doctor's goal of losing 20 pounds so I can be cleared to have the knee replacement. I have lost 10 pounds since starting Aquatic PT.\"   Changes to:  Meds, Allergies, Med Hx, Sx Hx?  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     OBJECTIVE    Therapeutic Procedures:  Tx Min Billable or 1:1 Min (if diff from Tx Min) Procedure, Rationale, Specifics   60 45 [id number] Aquatic Therapy (timed):  decrease pain, improve strength, flexibility, and range of motion using the buoyancy, thermal properties and consistent resistance of the water to improve patient's ability to progress to PLOF and address remaining functional goals.  (see flow sheet as applicable)     Details if applicable:       60 45 [facility] Totals Reminder: bill using total billable min of TIMED therapeutic procedures (example: do not include dry needle or estim unattended, both untimed codes, in totals to left)  8-22 min = 1 unit; 23-37 min = 2 units; 38-52 min = 3 units; 53-67 min = 4 units; 68-82 min = 5 units   Total Total     TOTAL TREATMENT TIME:        60     [x]  Patient Education billed concurrently with other procedures   [x] Review HEP    [] Progressed/Changed HEP, detail:    []  flex 4, ext 4-             7/22/25                    Patient will report pain less than 1-2/10 average to aid in completion of ADLs.                 Status at IE:2-8/10                 Current: 0-4/10                           7/31/25                    Patient will decrease TUG by 5 seconds to demonstrate increased safety in mobility.                 Status at IE: 15.9 seconds                 Current: 14.6 seconds                            7/22/25                    Patient will improve 30 second sit to stand score to 15 repetitions to demonstrate increased proximal bilateral LE strength and associated increased safety in mobility.                 Status at IE: 9 repetitions                 Current: 11 repetitions         7/29/25                    Patient will improve Lower Extremity Functional Scale score by MCID of 9 points to demonstrate improvement in functional ability..                 Status at IE: 30                 Current: 50          7/22/25    PLAN  Yes  Continue plan of care  []  Upgrade activities as tolerated  []  Discharge due to :  []  Other:    Joshua Hendrickson PT    7/31/2025    4:04 PM    Future Appointments   Date Time Provider Department Center   8/5/2025  4:10 PM Joshua Hendrickson, PT [id number]

## 2025-08-04 ENCOUNTER — TELEPHONE (OUTPATIENT)
Facility: HOSPITAL | Age: 60
End: 2025-08-04

## 2025-08-05 ENCOUNTER — HOSPITAL ENCOUNTER (OUTPATIENT)
Facility: HOSPITAL | Age: 60
Setting detail: RECURRING SERIES
Discharge: HOME | End: 2025-08-08
Payer: COMMERCIAL

## 2025-08-05 PROCEDURE — 97113 AQUATIC THERAPY/EXERCISES: CPT

## (undated) DEVICE — CATHETER DIAG 5FR L100CM LUMN ID0.047IN JR4 CRV 0 SIDE H

## (undated) DEVICE — SWAN-GANZ POLYMER BLEND TRUE SIZE C-TIP CONTROLCATH TD CATHETER: Brand: SWAN-GANZ CONTROLCATH TRUE SIZE

## (undated) DEVICE — SPLINT WR VELC FOAM NEUT POS DISP FOR RAD ART ACC SFT STRP

## (undated) DEVICE — GUIDEWIRE VASC L150CM DIA0.025IN TIP L7CM J RAD 3MM PTFE

## (undated) DEVICE — DRAPE,ANGIO,BRACH,STERILE,38X44: Brand: MEDLINE

## (undated) DEVICE — TORCON NB ADVANTAGE CATHETER: Brand: TORCON NB

## (undated) DEVICE — GLIDESHEATH SLENDER STAINLESS STEEL KIT: Brand: GLIDESHEATH SLENDER

## (undated) DEVICE — DRAPE EP LT SUBCLAV ENTRY SHLD SORBX

## (undated) DEVICE — PACK PROCEDURE SURG CATH CUST

## (undated) DEVICE — STOPCOCK TRNSDUC 500PSI 3 W ROT M LUER LT BLU OFF HNDL R

## (undated) DEVICE — BAND COMPR L24CM REG CLR PLAS HEMSTAT EXT HK AND LOOP RETEN

## (undated) DEVICE — GUIDEWIRE VASC L260CM DIA0.035IN TIP L3MM STD EXCHG PTFE J

## (undated) DEVICE — SENSOR PLSE OXMTR AD CBL L36IN ADH FRM FIT SPO2 DISP

## (undated) DEVICE — COVER US PRB W15XL120CM W/ GEL RUBBERBAND TAPE STRP FLD GEN